# Patient Record
Sex: MALE | Race: WHITE | Employment: FULL TIME | URBAN - NONMETROPOLITAN AREA
[De-identification: names, ages, dates, MRNs, and addresses within clinical notes are randomized per-mention and may not be internally consistent; named-entity substitution may affect disease eponyms.]

---

## 2018-05-06 ENCOUNTER — OFFICE VISIT (OUTPATIENT)
Dept: PRIMARY CARE CLINIC | Age: 38
End: 2018-05-06
Payer: COMMERCIAL

## 2018-05-06 VITALS
SYSTOLIC BLOOD PRESSURE: 122 MMHG | WEIGHT: 193 LBS | DIASTOLIC BLOOD PRESSURE: 84 MMHG | BODY MASS INDEX: 27.02 KG/M2 | RESPIRATION RATE: 16 BRPM | TEMPERATURE: 97.9 F | HEIGHT: 71 IN | HEART RATE: 65 BPM | OXYGEN SATURATION: 98 %

## 2018-05-06 DIAGNOSIS — M72.2 PLANTAR FASCIITIS: ICD-10-CM

## 2018-05-06 DIAGNOSIS — M10.072 ACUTE IDIOPATHIC GOUT OF LEFT FOOT: Primary | ICD-10-CM

## 2018-05-06 PROCEDURE — 99214 OFFICE O/P EST MOD 30 MIN: CPT | Performed by: FAMILY MEDICINE

## 2018-05-06 RX ORDER — COLCHICINE 0.6 MG/1
TABLET ORAL
Qty: 3 TABLET | Refills: 0 | Status: SHIPPED | OUTPATIENT
Start: 2018-05-06

## 2018-05-06 ASSESSMENT — ENCOUNTER SYMPTOMS: COLOR CHANGE: 1

## 2020-03-04 ENCOUNTER — OFFICE VISIT (OUTPATIENT)
Dept: PODIATRY | Age: 40
End: 2020-03-04
Payer: COMMERCIAL

## 2020-03-04 ENCOUNTER — HOSPITAL ENCOUNTER (OUTPATIENT)
Dept: LAB | Age: 40
Discharge: HOME | End: 2020-03-04
Payer: COMMERCIAL

## 2020-03-04 ENCOUNTER — TELEPHONE (OUTPATIENT)
Dept: PODIATRY | Age: 40
End: 2020-03-04

## 2020-03-04 VITALS
HEIGHT: 71 IN | WEIGHT: 195 LBS | SYSTOLIC BLOOD PRESSURE: 128 MMHG | BODY MASS INDEX: 27.3 KG/M2 | DIASTOLIC BLOOD PRESSURE: 78 MMHG | HEART RATE: 82 BPM

## 2020-03-04 LAB
HCT VFR BLD CALC: 43.2 % (ref 40.7–50.3)
HEMOGLOBIN: 14.9 G/DL (ref 13–17)
MCH RBC QN AUTO: 31.5 PG (ref 25.2–33.5)
MCHC RBC AUTO-ENTMCNC: 34.5 G/DL (ref 25.2–33.5)
MCV RBC AUTO: 91.3 FL (ref 82.6–102.9)
NRBC AUTOMATED: 0 PER 100 WBC
PDW BLD-RTO: 12.5 % (ref 11.8–14.4)
PLATELET # BLD: 139 K/UL (ref 138–453)
PMV BLD AUTO: 10 FL (ref 8.1–13.5)
RBC # BLD: 4.73 M/UL (ref 4.21–5.77)
SEDIMENTATION RATE, ERYTHROCYTE: 18 MM (ref 0–15)
URIC ACID: 7.1 MG/DL (ref 3.4–7)
WBC # BLD: 6.3 K/UL (ref 3.5–11.3)

## 2020-03-04 PROCEDURE — 85027 COMPLETE CBC AUTOMATED: CPT

## 2020-03-04 PROCEDURE — 85651 RBC SED RATE NONAUTOMATED: CPT

## 2020-03-04 PROCEDURE — 84550 ASSAY OF BLOOD/URIC ACID: CPT

## 2020-03-04 PROCEDURE — 36415 COLL VENOUS BLD VENIPUNCTURE: CPT

## 2020-03-04 PROCEDURE — 99203 OFFICE O/P NEW LOW 30 MIN: CPT | Performed by: PODIATRIST

## 2020-03-04 RX ORDER — METHYLPREDNISOLONE 4 MG/1
TABLET ORAL
Qty: 1 KIT | Refills: 0 | Status: SHIPPED | OUTPATIENT
Start: 2020-03-04

## 2020-03-04 RX ORDER — COLCHICINE 0.6 MG/1
0.6 TABLET ORAL 2 TIMES DAILY
Qty: 20 TABLET | Refills: 3 | Status: SHIPPED | OUTPATIENT
Start: 2020-03-04

## 2020-03-04 NOTE — PROGRESS NOTES
bilateral. No atrophic skin. Neurological: Sensation intact to light touch to level of digits, bilateral.  Protective sensation intact 10/10 sites via 5.07/10g Dewitt-Heriberto Monofilament, bilateral.  negative Tinel's, bilateral.  negative Valleix sign, bilateral.  Vibratory intact bilateral.  Reflexes Decreased bilateral.  Paresthesias negative. Dysthesias negative. Sharp/dull intact bilateral.    Musculoskeletal: Muscle strength 5/5, Bilateral.  Pain present upon palpation of dorsal 1st MPJ, Right. within normal limits medial longitudinal arch, Bilateral.  Ankle ROM within normal limits,Bilateral.  1st MPJ ROM guarded Right. Dorsally contracted digits absent digits none, Bilateral. Other foot deformities none. Integument: Warm, dry, supple, bilateral.  Open lesion absent, bilateral.  Interdigital maceration absent to web spaces bilateral.  Nails within normal limits. Fissures absent, bilateral. Hyperkeratotic tissue is absent. Gait analysis:   RCSP left is 1 degrees. Right is 1 degrees. NCSP left is 0 degrees. Right is 0 degrees. Medial talonavicular bulge is absent Bilateral.  Pes abductus is absent Bilateral.  Early toe off absent Bilateral.  Limb length discrepancy absent. Asessment: Patient is a 36 y.o. male with:    Diagnosis Orders   1. Acute gout involving toe of right foot, unspecified cause  CBC    Uric Acid    Sedimentation Rate   2. Inflammation of foot joint     3. Right foot pain         Plan: Patient examined and evaluated. Current condition and treatment options discussed in detail.     Orders Placed This Encounter   Procedures    CBC     Standing Status:   Future     Standing Expiration Date:   3/4/2021    Uric Acid     Standing Status:   Future     Standing Expiration Date:   4/4/2020    Sedimentation Rate     Standing Status:   Future     Standing Expiration Date:   3/4/2021   long term tx options for gout were discussed  Orders Placed This Encounter   Medications   